# Patient Record
Sex: FEMALE | Race: WHITE | Employment: FULL TIME | URBAN - METROPOLITAN AREA
[De-identification: names, ages, dates, MRNs, and addresses within clinical notes are randomized per-mention and may not be internally consistent; named-entity substitution may affect disease eponyms.]

---

## 2017-03-03 PROCEDURE — 87491 CHLMYD TRACH DNA AMP PROBE: CPT | Performed by: OBSTETRICS & GYNECOLOGY

## 2017-03-03 PROCEDURE — 87591 N.GONORRHOEAE DNA AMP PROB: CPT | Performed by: OBSTETRICS & GYNECOLOGY

## 2017-03-03 PROCEDURE — 87086 URINE CULTURE/COLONY COUNT: CPT | Performed by: OBSTETRICS & GYNECOLOGY

## 2017-03-10 PROCEDURE — 87340 HEPATITIS B SURFACE AG IA: CPT | Performed by: OBSTETRICS & GYNECOLOGY

## 2017-03-10 PROCEDURE — 86762 RUBELLA ANTIBODY: CPT | Performed by: OBSTETRICS & GYNECOLOGY

## 2017-03-10 PROCEDURE — 36415 COLL VENOUS BLD VENIPUNCTURE: CPT | Performed by: OBSTETRICS & GYNECOLOGY

## 2017-03-10 PROCEDURE — 86901 BLOOD TYPING SEROLOGIC RH(D): CPT | Performed by: OBSTETRICS & GYNECOLOGY

## 2017-03-10 PROCEDURE — 86850 RBC ANTIBODY SCREEN: CPT | Performed by: OBSTETRICS & GYNECOLOGY

## 2017-03-10 PROCEDURE — 85025 COMPLETE CBC W/AUTO DIFF WBC: CPT | Performed by: OBSTETRICS & GYNECOLOGY

## 2017-03-10 PROCEDURE — 87389 HIV-1 AG W/HIV-1&-2 AB AG IA: CPT | Performed by: OBSTETRICS & GYNECOLOGY

## 2017-03-10 PROCEDURE — 86780 TREPONEMA PALLIDUM: CPT | Performed by: OBSTETRICS & GYNECOLOGY

## 2017-03-10 PROCEDURE — 86900 BLOOD TYPING SEROLOGIC ABO: CPT | Performed by: OBSTETRICS & GYNECOLOGY

## 2017-04-07 ENCOUNTER — OFFICE VISIT (OUTPATIENT)
Dept: PERINATAL CARE | Facility: HOSPITAL | Age: 37
End: 2017-04-07
Attending: OBSTETRICS & GYNECOLOGY
Payer: COMMERCIAL

## 2017-04-07 VITALS
HEART RATE: 80 BPM | HEIGHT: 64 IN | DIASTOLIC BLOOD PRESSURE: 71 MMHG | WEIGHT: 141 LBS | BODY MASS INDEX: 24.07 KG/M2 | SYSTOLIC BLOOD PRESSURE: 133 MMHG

## 2017-04-07 DIAGNOSIS — K51.90 ULCERATIVE COLITIS WITHOUT COMPLICATIONS, UNSPECIFIED LOCATION (HCC): Primary | ICD-10-CM

## 2017-04-07 DIAGNOSIS — O09.511 AMA (ADVANCED MATERNAL AGE) PRIMIGRAVIDA 35+, FIRST TRIMESTER: ICD-10-CM

## 2017-04-07 PROCEDURE — 99242 OFF/OP CONSLTJ NEW/EST SF 20: CPT

## 2017-04-07 NOTE — PROGRESS NOTES
Outpatient Maternal-Fetal Medicine Consultation    Dear Dr. Chace Kaur    Thank you for requesting ultrasound evaluation and maternal fetal medicine consultation on your patient Cedric Marcial.   As you are aware she is a 29year old female  with a [place] edema    OBSTETRIC ULTRASOUND  The patient had a first trimester ultrasound today which revealed normal first trimester anatomy with size consistent with dates. The NT measurement was: 1.4 mm; this is within normal limits. The nasal bone is present.   ___ malformations  · Preeclampsia  · Gestational diabetes  · Intrauterine fetal death    As a result, enhanced pregnancy surveillance is advised for these patients including a comprehensive ultrasound to assess for fetal malformations  (at 20 weeks) and a thir detection and higher false positive rates. The patient has declined screening and diagnostic testing for fetal aneuploidy. She feels that she will not alter the management of her pregnancy even in the presence of a known  fetal aneuploidy.     CHLOE worsening symptoms during pregnancy. EFFECT OF IBD ON PREGNANCY   Pregnant women with IBD may be at increased risk for antepartum hemorrhage, low birth weight infants, and premature delivery.  However, the risk of congenital abnormalities does not appear breastfeeding is safe. Breastfeeding can be continued while on azathioprine and mercaptopurine after a detailed discussion of the risks and benefits of breastfeeding.     Anti-tumor necrosis factor agents  Experience with anti-tumor necrosis factor (anti-TN IBD on low residue diets, with ileal involvement, and patients on medications that interfere with folic acid metabolism (eg, sulfasalazine).   Iron and B12 levels should therefore be checked in the first trimester and supplementation should be provided as n

## 2017-06-02 ENCOUNTER — OFFICE VISIT (OUTPATIENT)
Dept: PERINATAL CARE | Facility: HOSPITAL | Age: 37
End: 2017-06-02
Attending: OBSTETRICS & GYNECOLOGY
Payer: COMMERCIAL

## 2017-06-02 VITALS
DIASTOLIC BLOOD PRESSURE: 61 MMHG | SYSTOLIC BLOOD PRESSURE: 139 MMHG | HEART RATE: 85 BPM | BODY MASS INDEX: 26 KG/M2 | WEIGHT: 153 LBS

## 2017-06-02 DIAGNOSIS — O09.512 AMA (ADVANCED MATERNAL AGE) PRIMIGRAVIDA 35+, SECOND TRIMESTER: Primary | ICD-10-CM

## 2017-06-02 PROBLEM — O09.519 AMA (ADVANCED MATERNAL AGE) PRIMIGRAVIDA 35+: Status: ACTIVE | Noted: 2017-06-02

## 2017-06-02 PROCEDURE — 99214 OFFICE O/P EST MOD 30 MIN: CPT

## 2017-06-02 NOTE — PROGRESS NOTES
Malick Salas    Dear Dr. Anjel Kemp    Thank you for requesting ultrasound evaluation and maternal fetal medicine consultation on your patient Jozef Dudley.   As you are aware she is a 29year old female  with a Singleto activity: present. Fetal heart rate: 152 bpm.   Fetal presentation: cephalic. Amniotic fluid: normal.   Cord: 3 vessels. Placenta: posterior.      Fetal Anatomy:  Visualized with normal appearance: head, face, spine, neck, skin, chest, abdominal wall, g for gestational diabetes and preeclampsia; hence, no further significant alterations in obstetric care are advised. Fetal Aneuploidy   We also discussed the increased risk of chromosomal abnormalities associated with advanced maternal age.  I reviewed paola She has regular follow-up with her gastroenterologist with no evidence of residual disease. Her most recent endoscopy within the last 6 months was fortunately negative.  In light of this disease in remission I suspect the patient will do quite well in pregn pregnancy.    MEDICATIONS DURING PREGNANCY AND LACTATION   Introduction   The choice of antiinflammatory and immunosuppressive used during pregnancy and lactation should be based upon their relative safety and indications as well as the risk of relapse of i Registry [[facility]]) of 35 67 15 pregnant women (329 unexposed, 242 exposed to azathioprine, 357 exposed to biologic therapy, and 109 on combination biologic therapy and azathioprine during pregnancy), the use of thiopurines was not associated with an increase in bowel disease should be dictated by obstetric necessity. In patients with an ileoanal anastomosis or J pouch, many advocate planned  delivery as well, though data suggest no long-term worsening of pouch function. \Vaginal delivery can be attempted i

## 2017-07-07 PROCEDURE — 82950 GLUCOSE TEST: CPT | Performed by: OBSTETRICS & GYNECOLOGY

## 2017-07-21 PROBLEM — O09.523 AMA (ADVANCED MATERNAL AGE) MULTIGRAVIDA 35+, THIRD TRIMESTER: Status: ACTIVE | Noted: 2017-07-21

## 2017-08-24 NOTE — PROGRESS NOTES
Artemio Andrews    Dear Dr. Key Hanson    Thank you for requesting ultrasound evaluation and maternal fetal medicine consultation on your patient Dee Venegas.   As you are aware she is a 29year old female  with a Singleto Cord: normal.   Placenta: posterior. Fetal Anatomy:  Visualized with normal appearance: head, chest, 4 chamber heart and great vessels, kidneys, bladder. Gastrointestinal Tract: stomach visible. Summary of Ultrasound Findings:  Impression:  The face-to-face time was 15 minutes.

## 2017-08-25 ENCOUNTER — OFFICE VISIT (OUTPATIENT)
Dept: PERINATAL CARE | Facility: HOSPITAL | Age: 37
End: 2017-08-25
Attending: OBSTETRICS & GYNECOLOGY
Payer: COMMERCIAL

## 2017-08-25 VITALS
WEIGHT: 162 LBS | HEART RATE: 101 BPM | SYSTOLIC BLOOD PRESSURE: 126 MMHG | BODY MASS INDEX: 28 KG/M2 | DIASTOLIC BLOOD PRESSURE: 76 MMHG

## 2017-08-25 DIAGNOSIS — K51.90 ULCERATIVE COLITIS WITHOUT COMPLICATIONS, UNSPECIFIED LOCATION (HCC): ICD-10-CM

## 2017-08-25 DIAGNOSIS — O09.523 AMA (ADVANCED MATERNAL AGE) MULTIGRAVIDA 35+, THIRD TRIMESTER: Primary | ICD-10-CM

## 2017-08-25 PROCEDURE — 76805 OB US >/= 14 WKS SNGL FETUS: CPT

## 2017-08-25 PROCEDURE — 76818 FETAL BIOPHYS PROFILE W/NST: CPT

## 2017-08-25 PROCEDURE — 99213 OFFICE O/P EST LOW 20 MIN: CPT

## 2017-09-15 PROCEDURE — 87653 STREP B DNA AMP PROBE: CPT | Performed by: OBSTETRICS & GYNECOLOGY

## 2017-09-15 PROCEDURE — 87081 CULTURE SCREEN ONLY: CPT | Performed by: OBSTETRICS & GYNECOLOGY

## 2017-09-21 PROBLEM — O09.513 AMA (ADVANCED MATERNAL AGE) PRIMIGRAVIDA 35+, THIRD TRIMESTER: Status: ACTIVE | Noted: 2017-09-21

## 2017-10-12 ENCOUNTER — HOSPITAL ENCOUNTER (INPATIENT)
Facility: HOSPITAL | Age: 37
LOS: 2 days | Discharge: HOME | End: 2017-10-14
Attending: OBSTETRICS & GYNECOLOGY | Admitting: OBSTETRICS & GYNECOLOGY
Payer: COMMERCIAL

## 2017-10-12 PROBLEM — Z34.90 PREGNANCY: Status: ACTIVE | Noted: 2017-10-12

## 2017-10-12 PROCEDURE — 84112 EVAL AMNIOTIC FLUID PROTEIN: CPT

## 2017-10-12 PROCEDURE — 86901 BLOOD TYPING SEROLOGIC RH(D): CPT | Performed by: OBSTETRICS & GYNECOLOGY

## 2017-10-12 PROCEDURE — 86900 BLOOD TYPING SEROLOGIC ABO: CPT | Performed by: OBSTETRICS & GYNECOLOGY

## 2017-10-12 PROCEDURE — 86850 RBC ANTIBODY SCREEN: CPT | Performed by: OBSTETRICS & GYNECOLOGY

## 2017-10-12 PROCEDURE — 86780 TREPONEMA PALLIDUM: CPT | Performed by: OBSTETRICS & GYNECOLOGY

## 2017-10-12 PROCEDURE — 0HQ9XZZ REPAIR PERINEUM SKIN, EXTERNAL APPROACH: ICD-10-PCS | Performed by: OBSTETRICS & GYNECOLOGY

## 2017-10-12 PROCEDURE — 85027 COMPLETE CBC AUTOMATED: CPT | Performed by: OBSTETRICS & GYNECOLOGY

## 2017-10-12 RX ORDER — HYDROCODONE BITARTRATE AND ACETAMINOPHEN 5; 325 MG/1; MG/1
2 TABLET ORAL EVERY 4 HOURS PRN
Status: DISCONTINUED | OUTPATIENT
Start: 2017-10-12 | End: 2017-10-14

## 2017-10-12 RX ORDER — BISACODYL 10 MG
10 SUPPOSITORY, RECTAL RECTAL ONCE AS NEEDED
Status: ACTIVE | OUTPATIENT
Start: 2017-10-12 | End: 2017-10-12

## 2017-10-12 RX ORDER — DEXTROSE, SODIUM CHLORIDE, SODIUM LACTATE, POTASSIUM CHLORIDE, AND CALCIUM CHLORIDE 5; .6; .31; .03; .02 G/100ML; G/100ML; G/100ML; G/100ML; G/100ML
INJECTION, SOLUTION INTRAVENOUS AS NEEDED
Status: DISCONTINUED | OUTPATIENT
Start: 2017-10-12 | End: 2017-10-12

## 2017-10-12 RX ORDER — SIMETHICONE 80 MG
80 TABLET,CHEWABLE ORAL 3 TIMES DAILY PRN
Status: DISCONTINUED | OUTPATIENT
Start: 2017-10-12 | End: 2017-10-14

## 2017-10-12 RX ORDER — TRISODIUM CITRATE DIHYDRATE AND CITRIC ACID MONOHYDRATE 500; 334 MG/5ML; MG/5ML
30 SOLUTION ORAL AS NEEDED
Status: DISCONTINUED | OUTPATIENT
Start: 2017-10-12 | End: 2017-10-12

## 2017-10-12 RX ORDER — SODIUM CHLORIDE, SODIUM LACTATE, POTASSIUM CHLORIDE, CALCIUM CHLORIDE 600; 310; 30; 20 MG/100ML; MG/100ML; MG/100ML; MG/100ML
INJECTION, SOLUTION INTRAVENOUS CONTINUOUS
Status: DISCONTINUED | OUTPATIENT
Start: 2017-10-12 | End: 2017-10-12

## 2017-10-12 RX ORDER — IBUPROFEN 600 MG/1
600 TABLET ORAL ONCE AS NEEDED
Status: DISCONTINUED | OUTPATIENT
Start: 2017-10-12 | End: 2017-10-12

## 2017-10-12 RX ORDER — TERBUTALINE SULFATE 1 MG/ML
0.25 INJECTION, SOLUTION SUBCUTANEOUS AS NEEDED
Status: DISCONTINUED | OUTPATIENT
Start: 2017-10-12 | End: 2017-10-12

## 2017-10-12 RX ORDER — ZOLPIDEM TARTRATE 5 MG/1
5 TABLET ORAL NIGHTLY PRN
Status: DISCONTINUED | OUTPATIENT
Start: 2017-10-12 | End: 2017-10-14

## 2017-10-12 RX ORDER — ACETAMINOPHEN 325 MG/1
650 TABLET ORAL EVERY 4 HOURS PRN
Status: DISCONTINUED | OUTPATIENT
Start: 2017-10-12 | End: 2017-10-14

## 2017-10-12 RX ORDER — DOCUSATE SODIUM 100 MG/1
100 CAPSULE, LIQUID FILLED ORAL
Status: DISCONTINUED | OUTPATIENT
Start: 2017-10-12 | End: 2017-10-14

## 2017-10-12 RX ORDER — HYDROCODONE BITARTRATE AND ACETAMINOPHEN 5; 325 MG/1; MG/1
1 TABLET ORAL EVERY 4 HOURS PRN
Status: DISCONTINUED | OUTPATIENT
Start: 2017-10-12 | End: 2017-10-14

## 2017-10-12 NOTE — PROGRESS NOTES
Pt is a 40year old female admitted to 115/115-A, Patient presents with:  Srom     Pt is 39w0d intra-uterine pregnancy. Patient grossly ruptured with clear fluid. Patient states SROM occurred at 0000. Reports +fetal movement.    History obtained, consents s

## 2017-10-12 NOTE — H&P
[facility] Patient Status:  Inpatient    1980 MRN [id number]   Location [facility] Attending Devin Duncan MD   Hosp Day # 0 PCP Brittni Baldwin MD     SUBJECTIVE:    Ashley Bernardo Grandmother      Social History:    Smoking status: Never Smoker    Smokeless tobacco: Never Used    Alcohol use Yes  0.0 oz/week     Comment: Cage done 1/15/16       Home Meds:   Prescriptions Prior to Admission:  Prenatal Vit-Fe Fumarate-FA (MULTI PRENAT maternal age, uclerative colitis. PLAN:  Patient desires expectant management with no pain medication/epidural.  Also desires only a saline lock. Risks, benefits, alternatives and possible complications have been discussed in detail with the patient.

## 2017-10-12 NOTE — PROGRESS NOTES
Pt transferred  to Mother Baby room [phone] [phone] in stable condition. Report given to teagan GUIDO  Infant transferred with mother in stable condition.

## 2017-10-12 NOTE — L&D DELIVERY NOTE
Harry, Girl  [[id number]]     Labor Events     labor?:  No    steroids?:  None   Antibiotics received during labor?:  No   Rupture date:  10/12/17   Rupture time:  0000   Rupture type:  SROM   Fluid color:  Clear   Induction:  None   Aug status:  Living   Apgar Scoring Key:     0 1 2    Skin color Blue or pale Acrocyanotic Completely pink    Heart rate Absent <100 bpm >100 bpm    Reflex irritability No response Grimace Cry or active withdrawal    Muscle tone Limp Some flexion Active motion

## 2017-10-12 NOTE — PROGRESS NOTES
NURSING ADMISSION NOTE      Patient admitted via Wheelchair . Phone report received from [facility] parnell prior to admission. Oriented to room 1118  Safety precautions initiated. Bed in low position. Call light in reach.  Plan of care discussed and questions

## 2017-10-12 NOTE — PLAN OF CARE
POSTPARTUM    • Optimize infant feeding at the breast Progressing    • Appropriate maternal -  bonding Progressing

## 2017-10-13 PROCEDURE — 85025 COMPLETE CBC W/AUTO DIFF WBC: CPT | Performed by: OBSTETRICS & GYNECOLOGY

## 2017-10-13 NOTE — PROGRESS NOTES
OB Progress Note PPD#1    S: Feels well. Ambulating, eating. Pain controlled. Moderate VB. Breastfeeding. Voiding without difficulty, + flatus, no BM  O:   Blood pressure 109/68, pulse 78, temperature 98.2 °F (36.8 °C), temperature source Oral, resp.  rate

## 2017-10-14 VITALS
BODY MASS INDEX: 28.34 KG/M2 | TEMPERATURE: 98 F | DIASTOLIC BLOOD PRESSURE: 61 MMHG | SYSTOLIC BLOOD PRESSURE: 114 MMHG | HEART RATE: 68 BPM | WEIGHT: 166 LBS | HEIGHT: 64 IN | RESPIRATION RATE: 18 BRPM

## 2017-10-14 NOTE — PROGRESS NOTES
OB Progress Note PPD#2    S: Feels well. Ambulating, eating. Pain controlled. Moderate VB. Breastfeeding. Voiding without difficulty, + flatus, no BM  O:   Blood pressure 114/61, pulse 68, temperature 98.3 °F (36.8 °C), temperature source Oral, resp.  rate

## 2017-10-14 NOTE — PLAN OF CARE
BREAST FEEDING    • Optimize infant feeding at the breast Progressing        INADEQUATE LATCH, SUCK OR SWALLOW    • Demonstrate ability to latch and sustain latch, audible swallowing and satiety Progressing        POSTPARTUM    • Optimize infant feeding at

## 2017-10-14 NOTE — PLAN OF CARE
BREAST FEEDING    • Optimize infant feeding at the breast Completed        INADEQUATE LATCH, SUCK OR SWALLOW    • Demonstrate ability to latch and sustain latch, audible swallowing and satiety Completed        POSTPARTUM    • Optimize infant feeding at the

## 2017-10-17 ENCOUNTER — TELEPHONE (OUTPATIENT)
Dept: OBGYN UNIT | Facility: HOSPITAL | Age: 37
End: 2017-10-17

## 2017-10-17 NOTE — PROGRESS NOTES
Reviewed self and infant care w / mom, she verbalizes understanding of instructions reviewed. Encourage to follow up w/ MDs as directed and w/ questions/concerns. C/o generalized edema, denies all sx of PIH, care reviewed.   reviewed and denies all sx of DV

## 2018-12-10 PROCEDURE — 88175 CYTOPATH C/V AUTO FLUID REDO: CPT | Performed by: OBSTETRICS & GYNECOLOGY

## 2018-12-10 PROCEDURE — 87624 HPV HI-RISK TYP POOLED RSLT: CPT | Performed by: OBSTETRICS & GYNECOLOGY

## 2021-05-17 ENCOUNTER — LAB ENCOUNTER (OUTPATIENT)
Dept: LAB | Age: 41
End: 2021-05-17
Attending: OBSTETRICS & GYNECOLOGY
Payer: COMMERCIAL

## 2021-05-17 DIAGNOSIS — Z01.818 PREOPERATIVE TESTING: ICD-10-CM

## 2021-05-17 DIAGNOSIS — O03.9 SAB (SPONTANEOUS ABORTION): ICD-10-CM

## 2021-05-17 PROCEDURE — 36415 COLL VENOUS BLD VENIPUNCTURE: CPT

## 2021-05-17 PROCEDURE — 85025 COMPLETE CBC W/AUTO DIFF WBC: CPT

## 2021-05-17 RX ORDER — ACETAMINOPHEN 500 MG
1000 TABLET ORAL ONCE
Status: CANCELLED | OUTPATIENT
Start: 2021-05-17 | End: 2021-05-17

## 2021-05-18 ENCOUNTER — TELEPHONE (OUTPATIENT)
Dept: OBGYN UNIT | Facility: HOSPITAL | Age: 41
End: 2021-05-18

## 2021-05-19 ENCOUNTER — ANESTHESIA EVENT (OUTPATIENT)
Dept: SURGERY | Facility: HOSPITAL | Age: 41
End: 2021-05-19
Payer: COMMERCIAL

## 2021-05-19 ENCOUNTER — HOSPITAL ENCOUNTER (OUTPATIENT)
Facility: HOSPITAL | Age: 41
Setting detail: HOSPITAL OUTPATIENT SURGERY
Discharge: HOME | End: 2021-05-19
Attending: OBSTETRICS & GYNECOLOGY | Admitting: OBSTETRICS & GYNECOLOGY
Payer: COMMERCIAL

## 2021-05-19 ENCOUNTER — ANESTHESIA (OUTPATIENT)
Dept: SURGERY | Facility: HOSPITAL | Age: 41
End: 2021-05-19
Payer: COMMERCIAL

## 2021-05-19 VITALS
BODY MASS INDEX: 23.81 KG/M2 | WEIGHT: 139.44 LBS | RESPIRATION RATE: 16 BRPM | OXYGEN SATURATION: 99 % | HEIGHT: 64 IN | DIASTOLIC BLOOD PRESSURE: 70 MMHG | TEMPERATURE: 99 F | SYSTOLIC BLOOD PRESSURE: 106 MMHG | HEART RATE: 93 BPM

## 2021-05-19 DIAGNOSIS — O03.9 MISCARRIAGE: ICD-10-CM

## 2021-05-19 DIAGNOSIS — O03.9 SAB (SPONTANEOUS ABORTION): Primary | ICD-10-CM

## 2021-05-19 PROCEDURE — 88305 TISSUE EXAM BY PATHOLOGIST: CPT | Performed by: OBSTETRICS & GYNECOLOGY

## 2021-05-19 PROCEDURE — 10D17ZZ EXTRACTION OF PRODUCTS OF CONCEPTION, RETAINED, VIA NATURAL OR ARTIFICIAL OPENING: ICD-10-PCS | Performed by: OBSTETRICS & GYNECOLOGY

## 2021-05-19 RX ORDER — HYDROMORPHONE HYDROCHLORIDE 1 MG/ML
0.4 INJECTION, SOLUTION INTRAMUSCULAR; INTRAVENOUS; SUBCUTANEOUS EVERY 5 MIN PRN
Status: DISCONTINUED | OUTPATIENT
Start: 2021-05-19 | End: 2021-05-19

## 2021-05-19 RX ORDER — METOCLOPRAMIDE HYDROCHLORIDE 5 MG/ML
10 INJECTION INTRAMUSCULAR; INTRAVENOUS AS NEEDED
Status: DISCONTINUED | OUTPATIENT
Start: 2021-05-19 | End: 2021-05-19

## 2021-05-19 RX ORDER — HYDROCODONE BITARTRATE AND ACETAMINOPHEN 5; 325 MG/1; MG/1
1 TABLET ORAL EVERY 6 HOURS PRN
Qty: 12 TABLET | Refills: 0 | Status: SHIPPED | OUTPATIENT
Start: 2021-05-19

## 2021-05-19 RX ORDER — HYDROCODONE BITARTRATE AND ACETAMINOPHEN 5; 325 MG/1; MG/1
1 TABLET ORAL AS NEEDED
Status: DISCONTINUED | OUTPATIENT
Start: 2021-05-19 | End: 2021-05-19

## 2021-05-19 RX ORDER — MIDAZOLAM HYDROCHLORIDE 1 MG/ML
INJECTION INTRAMUSCULAR; INTRAVENOUS AS NEEDED
Status: DISCONTINUED | OUTPATIENT
Start: 2021-05-19 | End: 2021-05-19 | Stop reason: SURG

## 2021-05-19 RX ORDER — MEPERIDINE HYDROCHLORIDE 25 MG/ML
12.5 INJECTION INTRAMUSCULAR; INTRAVENOUS; SUBCUTANEOUS AS NEEDED
Status: DISCONTINUED | OUTPATIENT
Start: 2021-05-19 | End: 2021-05-19

## 2021-05-19 RX ORDER — SODIUM CHLORIDE, SODIUM LACTATE, POTASSIUM CHLORIDE, CALCIUM CHLORIDE 600; 310; 30; 20 MG/100ML; MG/100ML; MG/100ML; MG/100ML
INJECTION, SOLUTION INTRAVENOUS CONTINUOUS
Status: DISCONTINUED | OUTPATIENT
Start: 2021-05-19 | End: 2021-05-19

## 2021-05-19 RX ORDER — DIPHENHYDRAMINE HYDROCHLORIDE 50 MG/ML
12.5 INJECTION INTRAMUSCULAR; INTRAVENOUS AS NEEDED
Status: DISCONTINUED | OUTPATIENT
Start: 2021-05-19 | End: 2021-05-19

## 2021-05-19 RX ORDER — NALOXONE HYDROCHLORIDE 0.4 MG/ML
80 INJECTION, SOLUTION INTRAMUSCULAR; INTRAVENOUS; SUBCUTANEOUS AS NEEDED
Status: DISCONTINUED | OUTPATIENT
Start: 2021-05-19 | End: 2021-05-19

## 2021-05-19 RX ORDER — ACETAMINOPHEN 500 MG
1000 TABLET ORAL EVERY 6 HOURS PRN
COMMUNITY

## 2021-05-19 RX ORDER — PHENYLEPHRINE HCL 10 MG/ML
VIAL (ML) INJECTION AS NEEDED
Status: DISCONTINUED | OUTPATIENT
Start: 2021-05-19 | End: 2021-05-19 | Stop reason: SURG

## 2021-05-19 RX ORDER — MEPERIDINE HYDROCHLORIDE 25 MG/ML
INJECTION INTRAMUSCULAR; INTRAVENOUS; SUBCUTANEOUS
Status: COMPLETED
Start: 2021-05-19 | End: 2021-05-19

## 2021-05-19 RX ORDER — ONDANSETRON 2 MG/ML
4 INJECTION INTRAMUSCULAR; INTRAVENOUS AS NEEDED
Status: DISCONTINUED | OUTPATIENT
Start: 2021-05-19 | End: 2021-05-19

## 2021-05-19 RX ORDER — SODIUM CHLORIDE 9 MG/ML
INJECTION, SOLUTION INTRAVENOUS CONTINUOUS PRN
Status: DISCONTINUED | OUTPATIENT
Start: 2021-05-19 | End: 2021-05-19 | Stop reason: SURG

## 2021-05-19 RX ORDER — HYDROCODONE BITARTRATE AND ACETAMINOPHEN 5; 325 MG/1; MG/1
2 TABLET ORAL AS NEEDED
Status: DISCONTINUED | OUTPATIENT
Start: 2021-05-19 | End: 2021-05-19

## 2021-05-19 RX ORDER — MIDAZOLAM HYDROCHLORIDE 1 MG/ML
1 INJECTION INTRAMUSCULAR; INTRAVENOUS EVERY 5 MIN PRN
Status: DISCONTINUED | OUTPATIENT
Start: 2021-05-19 | End: 2021-05-19

## 2021-05-19 RX ORDER — HYDROMORPHONE HYDROCHLORIDE 1 MG/ML
INJECTION, SOLUTION INTRAMUSCULAR; INTRAVENOUS; SUBCUTANEOUS
Status: COMPLETED
Start: 2021-05-19 | End: 2021-05-19

## 2021-05-19 RX ORDER — LIDOCAINE HYDROCHLORIDE 10 MG/ML
INJECTION, SOLUTION EPIDURAL; INFILTRATION; INTRACAUDAL; PERINEURAL AS NEEDED
Status: DISCONTINUED | OUTPATIENT
Start: 2021-05-19 | End: 2021-05-19 | Stop reason: SURG

## 2021-05-19 RX ADMIN — PHENYLEPHRINE HCL 300 MCG: 10 MG/ML VIAL (ML) INJECTION at 14:43:00

## 2021-05-19 RX ADMIN — PHENYLEPHRINE HCL 200 MCG: 10 MG/ML VIAL (ML) INJECTION at 14:38:00

## 2021-05-19 RX ADMIN — SODIUM CHLORIDE, SODIUM LACTATE, POTASSIUM CHLORIDE, CALCIUM CHLORIDE: 600; 310; 30; 20 INJECTION, SOLUTION INTRAVENOUS at 14:01:00

## 2021-05-19 RX ADMIN — SODIUM CHLORIDE, SODIUM LACTATE, POTASSIUM CHLORIDE, CALCIUM CHLORIDE: 600; 310; 30; 20 INJECTION, SOLUTION INTRAVENOUS at 14:48:00

## 2021-05-19 RX ADMIN — SODIUM CHLORIDE: 9 INJECTION, SOLUTION INTRAVENOUS at 15:16:00

## 2021-05-19 RX ADMIN — PHENYLEPHRINE HCL 200 MCG: 10 MG/ML VIAL (ML) INJECTION at 14:52:00

## 2021-05-19 RX ADMIN — SODIUM CHLORIDE: 9 INJECTION, SOLUTION INTRAVENOUS at 15:00:00

## 2021-05-19 RX ADMIN — SODIUM CHLORIDE, SODIUM LACTATE, POTASSIUM CHLORIDE, CALCIUM CHLORIDE: 600; 310; 30; 20 INJECTION, SOLUTION INTRAVENOUS at 15:13:00

## 2021-05-19 RX ADMIN — LIDOCAINE HYDROCHLORIDE 50 MG: 10 INJECTION, SOLUTION EPIDURAL; INFILTRATION; INTRACAUDAL; PERINEURAL at 14:05:00

## 2021-05-19 RX ADMIN — MIDAZOLAM HYDROCHLORIDE 2 MG: 1 INJECTION INTRAMUSCULAR; INTRAVENOUS at 14:03:00

## 2021-05-19 NOTE — OPERATIVE REPORT
[facility]  Operative Report    Reymundo Bright Patient Status:  Hospital Outpatient Surgery    1980 MRN [id number]   [facility] SURGERY Attending Jas Yen DO   Hosp Day # 0 PCP Duke Kenny MD       Date of pro uterus was massaged and found to be firm. Bleeding from the cervix was minimal.    She was awoken from anesthesia and taken to the recovery room in good condition. She tolerated the procedure well.

## 2021-05-19 NOTE — ANESTHESIA PROCEDURE NOTES
Spinal Block  Performed by: Hayder Stevens MD  Authorized by: Hayder Stevens MD       General Information and Staff    Start Time:   End Time: 5/19/2021 3:16 PM    Procedure Details      Needle      Assessment      Additional Comments

## 2021-05-19 NOTE — ANESTHESIA PREPROCEDURE EVALUATION
PRE-OP EVALUATION    Patient Name: Catherine Gagnon    Admit Diagnosis: SAB (spontaneous ) [O03.9]  Miscarriage [O03.9]    Pre-op Diagnosis: SAB (spontaneous ) [O03.9]  Miscarriage [O03.9]    SUCTION DILATION AND CURETTAGE    Anesthesia Pr 2; had remission for 5y but reactive since 12/06   • COLPOSCOPY,BX CERVIX/ENDOCERV CURR      in her 19's   • SPECIAL SERVICE OR REPORT      colpo x's 1 in 2002 -  normal   • SPECIAL SERVICE OR REPORT      anal warts resected - 2002     Social History    To

## 2021-05-19 NOTE — H&P
[facility] Patient Status:  Hospital Outpatient Surgery    1980 MRN [id number]   Location [address] [place] [address] Attending Tammie Clarke, [facility] Day # 0 PCP Ximena Sanches MD CAD, s/p MI   • Hypertension Father    • Lipids Father    • Heart Disease Father    • Other (hypercholesterolemia) Father    • Heart Disorder Maternal Grandmother         hx of MI   • Heart Disease Maternal Grandmother    • Hypertension Maternal Gr Detected   CBC W/ DIFFERENTIAL    Collection Time: 05/17/21 12:58 PM   Result Value Ref Range    WBC 7.4 4.0 - 11.0 x10(3) uL    RBC 3.95 3.80 - 5.30 x10(6)uL    HGB 12.7 12.0 - 16.0 g/dL    HCT 37.7 35.0 - 48.0 %    .0 150.0 - 450.0 10(3)uL    MCV

## 2021-05-19 NOTE — ANESTHESIA POSTPROCEDURE EVALUATION
[facility] Patient Status:  Hospital Outpatient Surgery   Age/Gender 45year old female MRN [id number]   [facility] SURGERY Attending Doretha Smith, [facility] Day # 0 PCP Eldon Cano MD       Anesthesia Po

## 2021-05-19 NOTE — ANESTHESIA PROCEDURE NOTES
Peripheral IV  Date/Time: 5/19/2021 3:00 PM  Inserted by: Maren Denton MD    Placement  Needle size: 18 G  Laterality: left  Location: hand  Local anesthetic: none  Site prep: Betadine  Technique: anatomical landmarks  Attempts: 1

## 2022-11-18 ENCOUNTER — WALK IN (OUTPATIENT)
Dept: URGENT CARE | Age: 42
End: 2022-11-18

## 2022-11-18 DIAGNOSIS — Z11.1 SCREENING-PULMONARY TB: Primary | ICD-10-CM

## 2022-11-18 PROCEDURE — 86580 TB INTRADERMAL TEST: CPT | Performed by: NURSE PRACTITIONER

## 2022-11-20 ENCOUNTER — WALK IN (OUTPATIENT)
Dept: URGENT CARE | Age: 42
End: 2022-11-20

## 2022-11-20 DIAGNOSIS — Z11.1 SCREENING FOR TUBERCULOSIS: Primary | ICD-10-CM

## 2022-11-20 LAB
INDURATION: 0 MM (ref 0–?)
SKIN TEST RESULT: NEGATIVE

## 2023-12-24 ENCOUNTER — V-VISIT (OUTPATIENT)
Dept: URGENT CARE | Age: 43
End: 2023-12-24

## 2023-12-24 VITALS
HEART RATE: 100 BPM | WEIGHT: 137 LBS | DIASTOLIC BLOOD PRESSURE: 64 MMHG | TEMPERATURE: 98.1 F | RESPIRATION RATE: 18 BRPM | SYSTOLIC BLOOD PRESSURE: 108 MMHG | BODY MASS INDEX: 23.39 KG/M2 | HEIGHT: 64 IN | OXYGEN SATURATION: 100 %

## 2023-12-24 DIAGNOSIS — J02.9 ACUTE PHARYNGITIS, UNSPECIFIED ETIOLOGY: Primary | ICD-10-CM

## 2023-12-24 LAB
INTERNAL PROCEDURAL CONTROLS ACCEPTABLE: YES
S PYO AG THROAT QL IA.RAPID: NEGATIVE
TEST LOT EXPIRATION DATE: NORMAL
TEST LOT NUMBER: NORMAL

## 2023-12-24 PROCEDURE — 87880 STREP A ASSAY W/OPTIC: CPT | Performed by: NURSE PRACTITIONER

## 2023-12-24 PROCEDURE — 87081 CULTURE SCREEN ONLY: CPT | Performed by: CLINICAL MEDICAL LABORATORY

## 2023-12-24 PROCEDURE — 99213 OFFICE O/P EST LOW 20 MIN: CPT | Performed by: NURSE PRACTITIONER

## 2023-12-24 ASSESSMENT — ENCOUNTER SYMPTOMS
CHILLS: 0
FEVER: 0
EYE DISCHARGE: 0
RHINORRHEA: 1
EYE ITCHING: 0
EYE REDNESS: 0
COUGH: 1
VOMITING: 0
NAUSEA: 1
FATIGUE: 0
DIARRHEA: 0
SORE THROAT: 1
HEADACHES: 0

## 2023-12-26 ENCOUNTER — TELEPHONE (OUTPATIENT)
Dept: URGENT CARE | Age: 43
End: 2023-12-26

## 2023-12-26 LAB — S PYO SPEC QL CULT: NORMAL

## 2024-01-05 ENCOUNTER — WALK IN (OUTPATIENT)
Dept: URGENT CARE | Age: 44
End: 2024-01-05

## 2024-01-05 VITALS
TEMPERATURE: 98.3 F | RESPIRATION RATE: 16 BRPM | DIASTOLIC BLOOD PRESSURE: 71 MMHG | OXYGEN SATURATION: 100 % | HEART RATE: 129 BPM | SYSTOLIC BLOOD PRESSURE: 129 MMHG

## 2024-01-05 DIAGNOSIS — R21 RASH: ICD-10-CM

## 2024-01-05 DIAGNOSIS — J02.9 SORE THROAT: Primary | ICD-10-CM

## 2024-01-05 LAB
HETEROPH AB SERPL QL IA: NEGATIVE
S PYO DNA THROAT QL NAA+PROBE: NOT DETECTED

## 2024-01-05 PROCEDURE — 87651 STREP A DNA AMP PROBE: CPT | Performed by: INTERNAL MEDICINE

## 2024-01-05 PROCEDURE — 86308 HETEROPHILE ANTIBODY SCREEN: CPT | Performed by: INTERNAL MEDICINE

## 2024-01-05 PROCEDURE — 99203 OFFICE O/P NEW LOW 30 MIN: CPT | Performed by: EMERGENCY MEDICINE

## 2024-01-05 RX ORDER — CEFUROXIME AXETIL 500 MG/1
500 TABLET ORAL 2 TIMES DAILY
Qty: 20 TABLET | Refills: 0 | Status: SHIPPED | OUTPATIENT
Start: 2024-01-05 | End: 2024-01-15

## 2024-02-26 ENCOUNTER — WALK IN (OUTPATIENT)
Dept: URGENT CARE | Age: 44
End: 2024-02-26

## 2024-02-26 VITALS
RESPIRATION RATE: 18 BRPM | DIASTOLIC BLOOD PRESSURE: 58 MMHG | TEMPERATURE: 98.4 F | OXYGEN SATURATION: 98 % | HEART RATE: 118 BPM | SYSTOLIC BLOOD PRESSURE: 118 MMHG

## 2024-02-26 DIAGNOSIS — R50.9 FEVER AND CHILLS: Primary | ICD-10-CM

## 2024-02-26 LAB
FLUAV AG UPPER RESP QL IA.RAPID: NEGATIVE
FLUBV AG UPPER RESP QL IA.RAPID: NEGATIVE
INTERNAL PROCEDURAL CONTROLS ACCEPTABLE: YES
TEST LOT EXPIRATION DATE: NORMAL
TEST LOT NUMBER: NORMAL

## 2024-02-26 PROCEDURE — 87804 INFLUENZA ASSAY W/OPTIC: CPT | Performed by: FAMILY MEDICINE

## 2024-02-26 PROCEDURE — 99213 OFFICE O/P EST LOW 20 MIN: CPT | Performed by: FAMILY MEDICINE

## 2024-02-26 ASSESSMENT — ENCOUNTER SYMPTOMS: COUGH: 1

## 2024-03-23 ENCOUNTER — WALK IN (OUTPATIENT)
Dept: URGENT CARE | Age: 44
End: 2024-03-23

## 2024-03-23 VITALS
SYSTOLIC BLOOD PRESSURE: 122 MMHG | RESPIRATION RATE: 18 BRPM | OXYGEN SATURATION: 99 % | HEART RATE: 119 BPM | TEMPERATURE: 97.8 F | DIASTOLIC BLOOD PRESSURE: 60 MMHG

## 2024-03-23 DIAGNOSIS — J03.90 TONSILLITIS: Primary | ICD-10-CM

## 2024-03-23 DIAGNOSIS — J02.9 SORE THROAT: ICD-10-CM

## 2024-03-23 DIAGNOSIS — J02.9 SORE THROAT: Primary | ICD-10-CM

## 2024-03-23 LAB — S PYO DNA THROAT QL NAA+PROBE: DETECTED

## 2024-03-23 RX ORDER — PREDNISONE 20 MG/1
40 TABLET ORAL DAILY
Qty: 10 TABLET | Refills: 0 | Status: SHIPPED | OUTPATIENT
Start: 2024-03-23 | End: 2024-03-28

## 2024-03-26 LAB — S PYO SPEC QL CULT: NORMAL

## (undated) DEVICE — GYN CDS: Brand: MEDLINE INDUSTRIES, INC.

## (undated) DEVICE — SCD SLEEVE KNEE HI BLEND

## (undated) DEVICE — CANISTER SAFETOUCH SYST DISP

## (undated) DEVICE — SOL  .9 1000ML BTL

## (undated) DEVICE — CURRETTE 7MM CVD

## (undated) DEVICE — CURRETTE 9MM CVD

## (undated) DEVICE — CURRETTE 8MM CVD

## (undated) DEVICE — STERILE POLYISOPRENE POWDER-FREE SURGICAL GLOVES: Brand: PROTEXIS

## (undated) DEVICE — TUBING SUCTION COLLECTION SET

## (undated) NOTE — Clinical Note
IMPRESSION: IUP at 12w1d AMA: declined screening and invasive testing for fetal aneuploidy Inflammatory bowel disease: Ulcerative colitis and Crohn's disease: Currently in remission  RECOMMENDATIONS: Continue care with Dr. Rangel Delay Level II at 20 weeks.  Luis A Tavarez

## (undated) NOTE — MR AVS SNAPSHOT
After Visit Summary   2017    Obed Reddy    MRN: [id number]           Visit Information     Date & Time  2017 10:30 AM Provider  [address]  [facility]  Dept.  Phone  [phone]     BP [email or web address]                     DO YOU KNOW WHERE TO GO? Injury & illness are never convenient. If you are dealing with a   non-emergency, consider your options before heading to an ER.          SAME DAY  APPOINTMENTS  Rose

## (undated) NOTE — Clinical Note
RECOMMENDATIONS: Continue care with Dr. Hilario Caputo ultrasound at 32 weeks. Weekly NST's at 36 weeks.

## (undated) NOTE — MR AVS SNAPSHOT
After Visit Summary   2017    Hina Mcneil    MRN: [id number]           Visit Information        Provider Department Dept Phone    2017  1:00 PM [facility]   Outpt [phone]      Your Vitals Were     BP Pulse Ht Wt BMI LMP [address] [facility]: [phone]  [address] [facility]. [address] [place]: [phone]  [facility], [address], [place]: [phone] [address] [facility]. [place] [address]: [phone]  [address]

## (undated) NOTE — MR AVS SNAPSHOT
After Visit Summary   2017    Ziyad Serna    MRN: [id number]           Visit Information        Provider Department Dept Phone    2017  3:00 PM [facility]   Outpt [phone]      Your Vitals Were     BP Pulse Wt LMP Important note regarding exams that require a referral/authorization: As a service to you, the [address] [place] [address] will obtain any necessary prior authorization required by your benefit plan provided you choose to have your test performed at one of the  Start activities slowly and build up over time Do what you like   Get your heart pumping – brisk walking, biking, swimming Even 10 minute increments are effective and add up over the week   2 ½ hours per week – spread out over time Use a baldemar to keep you